# Patient Record
Sex: MALE | Race: BLACK OR AFRICAN AMERICAN | NOT HISPANIC OR LATINO
[De-identification: names, ages, dates, MRNs, and addresses within clinical notes are randomized per-mention and may not be internally consistent; named-entity substitution may affect disease eponyms.]

---
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
## 2023-11-11 ENCOUNTER — EMERGENCY (EMERGENCY)
Facility: HOSPITAL | Age: 18
LOS: 0 days | Discharge: HOME | End: 2023-11-11
Attending: EMERGENCY MEDICINE
Payer: COMMERCIAL

## 2023-11-11 VITALS — HEIGHT: 65 IN | WEIGHT: 110.01 LBS

## 2023-11-11 VITALS
TEMPERATURE: 99 F | DIASTOLIC BLOOD PRESSURE: 61 MMHG | HEART RATE: 80 BPM | SYSTOLIC BLOOD PRESSURE: 103 MMHG | OXYGEN SATURATION: 100 % | RESPIRATION RATE: 14 BRPM

## 2023-11-11 DIAGNOSIS — Z79.01 LONG TERM (CURRENT) USE OF ANTICOAGULANTS: ICD-10-CM

## 2023-11-11 DIAGNOSIS — K51.90 ULCERATIVE COLITIS, UNSPECIFIED, WITHOUT COMPLICATIONS: ICD-10-CM

## 2023-11-11 DIAGNOSIS — Z90.89 ACQUIRED ABSENCE OF OTHER ORGANS: Chronic | ICD-10-CM

## 2023-11-11 DIAGNOSIS — M79.652 PAIN IN LEFT THIGH: ICD-10-CM

## 2023-11-11 DIAGNOSIS — M79.662 PAIN IN LEFT LOWER LEG: ICD-10-CM

## 2023-11-11 DIAGNOSIS — Z86.718 PERSONAL HISTORY OF OTHER VENOUS THROMBOSIS AND EMBOLISM: ICD-10-CM

## 2023-11-11 PROCEDURE — 93010 ELECTROCARDIOGRAM REPORT: CPT

## 2023-11-11 PROCEDURE — 99285 EMERGENCY DEPT VISIT HI MDM: CPT

## 2023-11-11 PROCEDURE — 93005 ELECTROCARDIOGRAM TRACING: CPT

## 2023-11-11 PROCEDURE — 93971 EXTREMITY STUDY: CPT | Mod: LT

## 2023-11-11 PROCEDURE — 93971 EXTREMITY STUDY: CPT | Mod: 26,LT

## 2023-11-11 PROCEDURE — 99284 EMERGENCY DEPT VISIT MOD MDM: CPT | Mod: 25

## 2023-11-11 NOTE — ED STATDOCS - PATIENT PORTAL LINK FT
You can access the FollowMyHealth Patient Portal offered by [facility] by registering at the following website: [email or web address]. By joining [facility]’s FollowMyHealth portal, you will also be able to view your health information using other applications (apps) compatible with our system.

## 2023-11-11 NOTE — ED STATDOCS - ATTENDING APP SHARED VISIT CONTRIBUTION OF CARE
Dr. Garcia: I performed a face to face bedside interview with patient regarding history of present illness, review of symptoms and past medical history. I completed an independent physical exam.  I have discussed patient's plan of care with PA.   I agree with note as stated above, having amended the EMR as needed to reflect my findings.   This includes HISTORY OF PRESENT ILLNESS, HIV, PAST MEDICAL/SURGICAL/FAMILY/SOCIAL HISTORY, ALLERGIES AND HOME MEDICATIONS, REVIEW OF SYSTEMS, PHYSICAL EXAM, and any PROGRESS NOTES during the time I functioned as the attending physician for this patient.  TARA Garcia DO

## 2023-11-11 NOTE — ED ADULT TRIAGE NOTE - CHIEF COMPLAINT QUOTE
Pt arrives to ED complaining of swelling to LLE. Pt with known history of LLE DVT currently on eliquis.

## 2023-11-11 NOTE — ED STATDOCS - NS ED ATTENDING STATEMENT MOD
This was a shared visit with the STEPHEN. I reviewed and verified the documentation and independently performed the documented:

## 2023-11-11 NOTE — ED STATDOCS - PROGRESS NOTE DETAILS
16 yo male with a PMH of UC, was on humira (stopped and switched to entyvio) presents with LLE pain since yesterday. Pt was dx with a DVT in february and was placed on eliquis. Was attempted by his hematologist to be taken off but had a sono around May 2023 which still showed the clot.   No calf pain. Mild ttp to the L lateral lower leg and L medial thigh. No significant swelling noted. Will check sono and reeval. -Rizwan Peck PA-C Sono showing post thrombotic changes. No clot seen. Informed pt and family at bedside. Advised to avoid nsaids, and only take tylenol for pain, and to speak with his hematologist for further evaluation. -Rizwan Peck PA-C

## 2023-11-11 NOTE — ED STATDOCS - PHYSICAL EXAMINATION
Gen:  Well appearing in NAD  Head:  NC/AT  HEENT: pupils perrl,no pharyngeal erythema, uvula midline  Cardiac: S1S2, RRR  Abd: Soft, non tender  Resp: No distress, CTA   musculoskeletal:: no deformities, no swelling, strength +5/+5. +Left leg slightly swollen.  Skin: warm and dry as visualized, no rashes  Neuro: SCHULTE, aao x 4  Psych:alert, cooperative, appropriate mood and affect for situation

## 2023-11-11 NOTE — ED STATDOCS - NSFOLLOWUPINSTRUCTIONS_ED_ALL_ED_FT
Follow up with your hematologist for further evaluation.   Do not take motrin/advil/aleve/ibuprofen.  Only take tylenol for pain. Apply cool compresses or ice over the area of pain.     Return to the Emergency Department for worsening or persistent symptoms, and/or ANY NEW OR CONCERNING SYMPTOMS. If you have issues obtaining follow up, please call: [phone] or [phone]  to obtain a doctor or specialist who takes your insurance in your area.

## 2023-11-11 NOTE — ED STATDOCS - OBJECTIVE STATEMENT
18 y/o male with PMHx of ulcerative colitis on Entyvio, recent LLE DVT (follows with hematologist) on Eliquis, stopped earlier this year but put back on after 5/2023 ultrasound redemonstrating DVT presents to the ED c/o left medial thigh pain in the setting of known LLE DVT, concerned for extension of existing DVT. Patient admits to missing a few doses of his Eliquis recently. Denies recent trauma to the area, SOB, calf tenderness.

## 2023-11-15 ENCOUNTER — EMERGENCY (EMERGENCY)
Facility: HOSPITAL | Age: 18
LOS: 0 days | Discharge: HOME | End: 2023-11-15
Attending: STUDENT IN AN ORGANIZED HEALTH CARE EDUCATION/TRAINING PROGRAM
Payer: COMMERCIAL

## 2023-11-15 VITALS
OXYGEN SATURATION: 99 % | TEMPERATURE: 98 F | DIASTOLIC BLOOD PRESSURE: 60 MMHG | RESPIRATION RATE: 18 BRPM | HEART RATE: 92 BPM | SYSTOLIC BLOOD PRESSURE: 110 MMHG

## 2023-11-15 VITALS — WEIGHT: 110.01 LBS | HEIGHT: 65 IN

## 2023-11-15 DIAGNOSIS — Z79.01 LONG TERM (CURRENT) USE OF ANTICOAGULANTS: ICD-10-CM

## 2023-11-15 DIAGNOSIS — K51.90 ULCERATIVE COLITIS, UNSPECIFIED, WITHOUT COMPLICATIONS: ICD-10-CM

## 2023-11-15 DIAGNOSIS — M79.89 OTHER SPECIFIED SOFT TISSUE DISORDERS: ICD-10-CM

## 2023-11-15 DIAGNOSIS — M25.462 EFFUSION, LEFT KNEE: ICD-10-CM

## 2023-11-15 DIAGNOSIS — Z90.89 ACQUIRED ABSENCE OF OTHER ORGANS: Chronic | ICD-10-CM

## 2023-11-15 DIAGNOSIS — Z86.718 PERSONAL HISTORY OF OTHER VENOUS THROMBOSIS AND EMBOLISM: ICD-10-CM

## 2023-11-15 DIAGNOSIS — M25.062 HEMARTHROSIS, LEFT KNEE: ICD-10-CM

## 2023-11-15 PROCEDURE — 73564 X-RAY EXAM KNEE 4 OR MORE: CPT | Mod: LT

## 2023-11-15 PROCEDURE — 99284 EMERGENCY DEPT VISIT MOD MDM: CPT

## 2023-11-15 PROCEDURE — 73564 X-RAY EXAM KNEE 4 OR MORE: CPT | Mod: 26,LT

## 2023-11-15 PROCEDURE — 99283 EMERGENCY DEPT VISIT LOW MDM: CPT | Mod: 25

## 2023-11-15 RX ORDER — OXYCODONE HYDROCHLORIDE 5 MG/1
1 TABLET ORAL
Qty: 6 | Refills: 0
Start: 2023-11-15 | End: 2023-11-16

## 2023-11-15 RX ORDER — OXYCODONE HYDROCHLORIDE 5 MG/1
5 TABLET ORAL ONCE
Refills: 0 | Status: DISCONTINUED | OUTPATIENT
Start: 2023-11-15 | End: 2023-11-15

## 2023-11-15 RX ORDER — ACETAMINOPHEN 500 MG
975 TABLET ORAL ONCE
Refills: 0 | Status: COMPLETED | OUTPATIENT
Start: 2023-11-15 | End: 2023-11-15

## 2023-11-15 RX ADMIN — Medication 975 MILLIGRAM(S): at 15:39

## 2023-11-15 RX ADMIN — OXYCODONE HYDROCHLORIDE 5 MILLIGRAM(S): 5 TABLET ORAL at 15:39

## 2023-11-15 NOTE — ED STATDOCS - PHYSICAL EXAMINATION
Const: Well appearing, NAD  Eyes: PERRL, EOM intact  CV: RRR, no murmurs, no chest wall tenderness, distal pulses intact  Resp: CTAB, normal resp effort  GI: soft, nondistended, nontender  MSK: Full ROM, no muscle or bony deformity or tenderness. +Left knee effusion.  Neuro: AOx3, GCS 15, No focal deficits  Skin: No rash, laceration or abrasion  Psych: calm, cooperative, No SI, HI, hallucination. Const: Well appearing, NAD  Eyes: PERRL, EOM intact  CV: RRR, no murmurs, no chest wall tenderness, distal pulses intact  Resp: CTAB, normal resp effort  GI: soft, nondistended, nontender  MSK: Full ROM, no muscle or bony deformity. +Left knee effusion with tenderness.  Neuro: AOx3, GCS 15, No focal deficits  Skin: No rash, laceration or abrasion  Psych: calm, cooperative, No SI, HI, hallucination.

## 2023-11-15 NOTE — ED ADULT NURSE NOTE - NSFALLUNIVINTERV_ED_ALL_ED
Bed/Stretcher in lowest position, wheels locked, appropriate side rails in place/Call bell, personal items and telephone in reach/Instruct patient to call for assistance before getting out of bed/chair/stretcher/Non-slip footwear applied when patient is off stretcher/[place] to call system/Physically safe environment - no spills, clutter or unnecessary equipment/Purposeful proactive rounding/Room/bathroom lighting operational, light cord in reach

## 2023-11-15 NOTE — ED STATDOCS - OBJECTIVE STATEMENT
16 y/o male with PMHx of ulcerative colitis on Entyvio, diagnosed with DVT in April on Eliquis presents to the ED c/o left knee swelling. Patient was seen here 11/11 for ultrasound and was told his DVTs had gotten smaller, likely blood clot scarring, however presents today for persistent pain/swelling and difficulty walking. Has been taking 2 Tylenol per day for pain.

## 2023-11-15 NOTE — ED STATDOCS - PROGRESS NOTE DETAILS
Pt. is a 17 year old male Hx ulcerative colitis on Eliquis, presents with left knee swelling.  Pt. states he bumped it on a bedframe four days ago and swelling worse.  Pt. evaluated in ED 11/11 and his DVT (has history) has improved.  Pt. now with pain, swelling of knee.  No new trauma.  Tylenol taken without relief. Nydia George PA-C xray negative.  Probable hemarthrosis due to Eliquis use.  Will apply ace wrap and orthopedic follow up.  Discussed close follow up with patient and return precautions.  Pt. deferred cane .  Nydia George PA-C

## 2023-11-15 NOTE — ED STATDOCS - ATTENDING APP SHARED VISIT CONTRIBUTION OF CARE
I, Trey Guido MD,  performed the initial face to face bedside interview with this patient regarding history of present illness, review of symptoms and relevant past medical, social and family history.  I completed an independent physical examination.  I was the initial provider who evaluated this patient.   I personally saw the patient and performed a substantive portion of the visit including all aspects of the medical decision making.  I have signed out the follow up of any pending tests (i.e. labs, radiological studies) to the STEPHEN.  I have communicated the patient’s plan of care and disposition with the STEPHEN.  The history, relevant review of systems, past medical and surgical history, medical decision making, and physical examination was documented by the scribe in my presence and I attest to the accuracy of the documentation.

## 2023-11-15 NOTE — ED STATDOCS - OTHER DETAILS FREE TEXT FOR MDM ADDL HISTORY OBTAINED FROM QUESTION
Chris Plunkett MD  Physician  Internal Medicine  Progress Notes      Signed  Date of Service:  2023  8:35 AM     Signed                                                                                                                                                                                                                                                                  [address] [place] [address], [facility]  [phone]        Hospitalist Progress Note        NAME:            Sofia Faye              :               10/4/1935  MRM:              [id number]     Date of service:         2023  8:35 AM         Assessment and Plan:   GI bleed/ Occult blood positive stool: Her hemoglobin from  of this year, which was 12.3 and on admission was 8. 3. today 6.2. Keep n.p.o., IV fluids. Plan for EGD today. Monitor H&H. GI evaluation appreciated      2. Anemia due to GI bleed. Hb 6.2 today. Will transfuse 1 unit of PRBC. Consent received for her son. Monitor H&H and transfuse as needed. 3.  HTN (hypertension). Continue home lisinopril. Hold hydrochlorothiazide. Hydralazine IV PRN       4. Panlobular emphysema ([address]). Stable. Not wheezing. Nebs as needed      5. Dementia (HCC)/ delirium. Haldol IV as needed. May need a sitter. CODE STATUS: DNR (decision-maker: Son)            Subjective:      Chief Complaint[de-identified] Patient was seen and examined as a follow up for GI bleed. Chart was reviewed. confused     ROS:  (bold if positive, if negative)     Tolerating PT             Tolerating Diet         Objective:      Last 24hrs VS reviewed since prior progress note.  Most recent are:         Vitals:     23 0505   BP: (!) 160/72   Pulse:     Resp:     Temp:     SpO2:            SpO2 Readings from Last 6 Encounters:   23 97%   06/15/23 95%   06/10/23 97%   23 97%           Intake/Output Summary (Last 24 hours) at 2023 0835  Last BUN 65* 41*   ALT 11*  --          No results found for: GLUCPOC  No results for input(s): PH, PCO2, PO2, HCO3, FIO2 in the last 72 hours. No results for input(s): INR in the last 72 hours. [unfilled]     I have reviewed notes of prior 24hr. Other pertinent lab: Total time: -35- minutes. I personally saw and examined the patient during this time period. Greater than 50% of this time was spent in counseling and coordination of care. I personally reviewed chart, notes, data and current medications in the medical record. I have personally examined and treated the patient at bedside during this period. Care Plan discussed with: Patient, Family, Nursing Staff, and >50% of time spent in counseling and coordination of care     Discussed:  Care Plan     Prophylaxis:  SCD's     Disposition:  SNF/LTC                                                                                        ___________________________________________________     Attending Physician:   MD Preethi Carrizales MD  Physician  Gastroenterology  Progress Notes      Signed  Date of Service:  8/7/2023  3:31 PM     Signed                      GI     Very extensive ulceration second portion of the duodenum identified; significant ulceration of the gastric antrum also seen. Fortunately no active blood loss at this point in time. We will place on 4 times daily pantoprazole for 2 weeks; this dosing schedule mimics total milligrams dosing used for constant infusions for acute GI blood loss. After 2 weeks can drop dose to twice daily but will leave on twice daily dosing long-term. From GI point of view discharge when hemoglobin stable.      Thank you for your kind referrals              ED to Hosp-Admission (Discharged) on 8/6/2023    ED to Hosp-Admission (Discharged) on 8/6/2023      Detailed Prior ED visit.

## 2023-11-15 NOTE — ED ADULT TRIAGE NOTE - CHIEF COMPLAINT QUOTE
pt presents to ER with left knee swelling since this past weekend.. hx DVT in April started on Apixaban. swelling to kneecap significant. + tactile warmth. pt states "I feel something moving in the knee". denies SOB at this time.

## 2023-11-15 NOTE — ED STATDOCS - PATIENT PORTAL LINK FT
You can access the FollowMyHealth Patient Portal offered by [facility] by registering at the following website: [email or web address]. By joining [employer]’s FollowMyHealth portal, you will also be able to view your health information using other applications (apps) compatible with our system.

## 2023-11-15 NOTE — ED STATDOCS - PRINCIPAL DIAGNOSIS
[Negative] : Constitutional [FreeTextEntry5] : see HPI [FreeTextEntry6] : breathing improved [FreeTextEntry9] : shoulder pain Hemarthrosis

## 2023-11-15 NOTE — ED STATDOCS - NSFOLLOWUPINSTRUCTIONS_ED_ALL_ED_FT
Hemarthrosis    WHAT YOU NEED TO KNOW:    What is hemarthrosis? Hemarthrosis is bleeding into a joint, usually after an injury. Blood vessels inside the joint are damaged and bleed. The blood then collects in the joint space. The shoulder and knee joints are most commonly affected. Elbow, ankle, and hip joints may also be affected.    What increases my risk for hemarthrosis?    Hemophilia or other blood disorder    Blood thinner medicine    A tumor, neuropathy, or myelopathy    Osteoarthritis or septic arthritis    Arthroplasty (surgery) on your knee joint    Cartilage or vascular damage    Sickle cell disease or scurvy  What are the signs and symptoms of hemarthrosis?    Warmth or tingling in the joint    Joint pain or swelling    Red skin over the affected joint    Trouble moving the joint, or joint stiffness  How is hemarthrosis diagnosed? Your healthcare provider will examine your affected joint. Tell your provider about your symptoms and when they started. Your provider will ask about any medical conditions you have, such as hemophilia. Tell your provider if your joint was injured or if you had a recent knee arthroplasty. Your provider may ask if you are taking blood thinner medicine. You may also need any of the following:    Aspiration is a procedure used to take fluid from the joint to be tested for blood. This procedure is also called arthrocentesis. Your healthcare provider may also use aspiration to treat your hemarthrosis.    MRI or ultrasound pictures may show joint damage or other problems. Do not enter the MRI room with anything metal. Metal can cause serious damage. Tell the healthcare provider if you have any metal in or on your body.  How is hemarthrosis treated?    Rest may help stop the bleeding. Your healthcare provider may also want you to use crutches or a sling to help rest the affected joint. Your provider may recommend that you limit the amount of time you rest the joint. Long periods without movement can cause problems such as muscle contracture (shortening).    Medicines may be given to improve clotting if you have hemophilia. If your hemarthrosis was caused by blood thinners, your provider may change your dose. Your provider may have you stop using them until your blood clotting ability improves. Do not stop taking your medicine unless directed. A sudden stop can be life-threatening.    Surgery may be used to remove lining from the joint, or bone from near the affected joint.    Joint replacement may be needed if other treatments do not work. All or part of the joint is replaced with an artificial piece during surgery.  What can I do to manage hemarthrosis?    Ask about medicines. Talk to your healthcare provider about all medicines you currently take. Do not take any medicine, vitamin, or supplement without talking to your healthcare provider. Ask if it is safe for you to take aspirin. Aspirin can affect blood clotting.    Return to activities as directed. You may need to wait until your joint is rested or medicines are working. Ask if it is safe for you to play sports, such as football, that can cause injury or bleeding. Sports can be especially dangerous if you have hemophilia.    Go to physical therapy as directed. Your healthcare provider may also recommend that you work with a physical therapist if you have joint damage from hemarthrosis. A physical therapist can help improve your joint's range of motion.    Exercise as directed. Exercise helps strengthen muscles and keeps joints healthy. Strong muscles also help protect joints. Your healthcare provider may recommend exercise such as swimming, walking, or riding a bicycle. Ask how much exercise you need and which exercises are best for you.    Apply ice to the joint as directed. Ice helps reduce pain and swelling. Ice can also help prevent tissue damage. Use an ice pack, or put ice in a plastic bag and cover it with a towel. Place the ice pack or bag on the affected joint for 15 minutes every hour, or as directed.  When should I seek immediate care?    You have new or worsening joint pain.    You have joint pain that moves to a muscle or other area.    You cannot move the joint.  When should I contact my healthcare provider?    You have pain that does not get better after you take your pain medicine.    You have questions or concerns about your condition or care.  CARE AGREEMENT:    You have the right to help plan your care. Learn about your health condition and how it may be treated. Discuss treatment options with your healthcare providers to decide what care you want to receive. You always have the right to refuse treatment.    © [employer] 1973, 2023    	  back to top

## 2023-11-15 NOTE — ED STATDOCS - CLINICAL SUMMARY MEDICAL DECISION MAKING FREE TEXT BOX
Patient with left knee effusion, history of DVTs on Eliquis. Seen here 11/11, received sonogram at that time with no findings for DVT. Will get XR left knee, pain control, reassess. Patient with left knee effusion, history of DVTs on Eliquis. Seen here 11/11, received sonogram at that time with no findings for DVT. Will get XR left knee, pain control, reassess.          xray negative.  Probable hemarthrosis due to Eliquis use.  Will apply ace wrap and orthopedic follow up.  Discussed close follow up with patient and return precautions.  Pt. deferred cane .  Nydia George PA-C

## 2023-11-15 NOTE — ED STATDOCS - CARE PROVIDER_API CALL
Augusto Teran  Orthopaedic Surgery  [address], [address]  [place] [address]  Phone: [phone]  Fax: [phone]  Follow Up Time:

## 2023-11-15 NOTE — ED ADULT NURSE NOTE - CAS ELECT INFOMATION PROVIDED
[facility] Gastroenterology  Pre Procedure History & Physical    Chief Complaint:   Screening    Subjective     HPI:   Screening  His father had colon cancer at a young age.  Denies any other family history of colon polyps or colon cancer.  1 relative did have throat cancer.  Past Medical History:   Past Medical History:   Diagnosis Date   • Asthma     Mild COPD   • Cholelithiasis     Surgically removed   • COPD (chronic obstructive pulmonary disease) (HCC)    • Encephalitis    • Hypertension    • Liver fibrosis     stage 3   • Low back pain    • Obesity All my life   • Pancreatitis    • Pulmonary hypertension (HCC)     patient states he haad a tralee event while giving blood and went into pulmonary htn   • Scoliosis    • Sleep apnea     cpap   • Thoracic back pain        Past Surgical History:  Past Surgical History:   Procedure Laterality Date   • CHOLECYSTECTOMY     • ENDOSCOPY N/A 2020    Procedure: ESOPHAGOGASTRODUODENOSCOPY WITH ANESTHESIA;  Surgeon: Solo Chirinos MD;  Location: [facility] ENDOSCOPY;  Service: Gastroenterology;  Laterality: N/A;  preop; acid reflux; abdominal pain; difficulty swallowing  postop  Frandy Park MD   • SINUS SURGERY         Family History:  Family History   Problem Relation Age of Onset   • Colon cancer Father    • Cancer Father            • Early death Father            • Liver disease Mother            • Early death Mother            • Heart disease Mother            • Asthma Sister    • Alcohol abuse Maternal Grandfather            • Cancer Maternal Grandfather            • Vision loss Maternal Grandfather    • Cancer Maternal Grandmother            • Miscarriages / Stillbirths Maternal Grandmother    • Alcohol abuse Paternal Grandfather            • Diabetes Paternal Grandmother    • Anxiety disorder Daughter    • Birth defects Daughter    • Developmental Disability Daughter   "  • Learning disabilities Daughter    • Mental illness Daughter    • Asthma Sister    • Birth defects Son    • Developmental Disability Son    • Learning disabilities Son    • Mental illness Son        Social History:   reports that he has never smoked. He has never used smokeless tobacco. He reports that he does not drink alcohol and does not use drugs.    Medications:   Prior to Admission medications    Medication Sig Start Date End Date Taking? Authorizing Provider   amLODIPine (Norvasc) 10 MG tablet Take 1 tablet by mouth Daily. 3/15/23  Yes Frandy Park MD   Unable to find 1 each 1 (One) Time. Tuddca    biosalt supplement   500mg BID    ProviderMichael MD       Allergies:  Patient has no known allergies.    ROS:    General: Weight stable  Resp: No SOA  Cardiovascular: No CP    Objective     Blood pressure 140/80, pulse 69, temperature 97 °F (36.1 °C), temperature source Temporal, resp. rate 20, height 180.3 cm (71\"), weight (!) 151 kg (332 lb), SpO2 99 %.    Physical Exam   Constitutional: Pt is oriented to person, place, and in no distress.   Cardiovascular: Normal rate, regular rhythm.    Pulmonary/Chest: Effort normal. No respiratory distress.   Abdominal: Non-distended.  Psychiatric: Mood, memory, affect and judgment appear normal.     Assessment & Plan     Diagnosis:  Screening    Anticipated Surgical Procedure:  Colonoscopy    The risks, benefits, and alternatives of this procedure have been discussed with the patient or the responsible party- the patient understands and agrees to proceed.    EMR Dragon/transcription disclaimer:  Much of this encounter note is electronic transcription/translation of spoken language to printed text.  The electronic translation of spoken language may be erroneous, or at times, nonsensical words or phrases may be inadvertently transcribed.  Although I have reviewed the note for such errors, some may still exist.  " DC instructions

## 2023-11-16 ENCOUNTER — APPOINTMENT (OUTPATIENT)
Dept: ORTHOPEDIC SURGERY | Facility: CLINIC | Age: 18
End: 2023-11-16
Payer: COMMERCIAL

## 2023-11-16 VITALS
BODY MASS INDEX: 18.11 KG/M2 | HEART RATE: 92 BPM | DIASTOLIC BLOOD PRESSURE: 60 MMHG | HEIGHT: 65.5 IN | WEIGHT: 110 LBS | SYSTOLIC BLOOD PRESSURE: 95 MMHG

## 2023-11-16 DIAGNOSIS — Z87.19 PERSONAL HISTORY OF OTHER DISEASES OF THE DIGESTIVE SYSTEM: ICD-10-CM

## 2023-11-16 DIAGNOSIS — Z86.718 PERSONAL HISTORY OF OTHER VENOUS THROMBOSIS AND EMBOLISM: ICD-10-CM

## 2023-11-16 DIAGNOSIS — M65.9 SYNOVITIS AND TENOSYNOVITIS, UNSPECIFIED: ICD-10-CM

## 2023-11-16 PROCEDURE — 99203 OFFICE O/P NEW LOW 30 MIN: CPT

## 2023-11-17 ENCOUNTER — APPOINTMENT (OUTPATIENT)
Dept: VASCULAR SURGERY | Facility: CLINIC | Age: 18
End: 2023-11-17
Payer: COMMERCIAL

## 2023-11-17 VITALS
HEIGHT: 65.5 IN | SYSTOLIC BLOOD PRESSURE: 104 MMHG | OXYGEN SATURATION: 100 % | HEART RATE: 76 BPM | DIASTOLIC BLOOD PRESSURE: 71 MMHG

## 2023-11-17 DIAGNOSIS — I82.519 CHRONIC EMBOLISM AND THROMBOSIS OF UNSPECIFIED FEMORAL VEIN: ICD-10-CM

## 2023-11-17 PROCEDURE — 99203 OFFICE O/P NEW LOW 30 MIN: CPT

## 2023-11-20 PROBLEM — I82.519 DVT, FEMORAL, CHRONIC: Status: ACTIVE | Noted: 2023-11-20

## 2023-11-22 DIAGNOSIS — J45.40 MODERATE PERSISTENT ASTHMA, UNCOMPLICATED: ICD-10-CM

## 2023-11-22 PROBLEM — Z87.19 HISTORY OF ULCERATIVE COLITIS: Status: RESOLVED | Noted: 2023-11-21 | Resolved: 2023-11-22

## 2023-11-22 PROBLEM — Z86.718 HISTORY OF BLOOD CLOTS: Status: RESOLVED | Noted: 2023-11-21 | Resolved: 2023-11-22

## 2023-11-30 PROBLEM — M65.9 SYNOVITIS: Status: ACTIVE | Noted: 2023-11-17

## 2023-12-14 PROBLEM — K51.90 ULCERATIVE COLITIS, UNSPECIFIED, WITHOUT COMPLICATIONS: Chronic | Status: ACTIVE | Noted: 2023-11-25

## 2023-12-14 PROBLEM — I82.409 ACUTE EMBOLISM AND THROMBOSIS OF UNSPECIFIED DEEP VEINS OF UNSPECIFIED LOWER EXTREMITY: Chronic | Status: ACTIVE | Noted: 2023-11-25

## 2023-12-20 ENCOUNTER — APPOINTMENT (OUTPATIENT)
Dept: MRI IMAGING | Facility: CLINIC | Age: 18
End: 2023-12-20

## 2024-01-30 ENCOUNTER — APPOINTMENT (OUTPATIENT)
Dept: MRI IMAGING | Facility: CLINIC | Age: 19
End: 2024-01-30
Payer: COMMERCIAL

## 2024-01-30 ENCOUNTER — TRANSCRIPTION ENCOUNTER (OUTPATIENT)
Age: 19
End: 2024-01-30

## 2024-01-30 PROCEDURE — 73721 MRI JNT OF LWR EXTRE W/O DYE: CPT | Mod: LT

## 2024-01-31 ENCOUNTER — NON-APPOINTMENT (OUTPATIENT)
Age: 19
End: 2024-01-31

## 2024-02-05 ENCOUNTER — APPOINTMENT (OUTPATIENT)
Dept: ORTHOPEDIC SURGERY | Facility: CLINIC | Age: 19
End: 2024-02-05

## 2024-02-15 ENCOUNTER — RESULT REVIEW (OUTPATIENT)
Age: 19
End: 2024-02-15

## 2024-02-15 ENCOUNTER — APPOINTMENT (OUTPATIENT)
Dept: RHEUMATOLOGY | Facility: CLINIC | Age: 19
End: 2024-02-15
Payer: COMMERCIAL

## 2024-02-15 ENCOUNTER — LABORATORY RESULT (OUTPATIENT)
Age: 19
End: 2024-02-15

## 2024-02-15 VITALS
BODY MASS INDEX: 18.83 KG/M2 | WEIGHT: 113 LBS | OXYGEN SATURATION: 98 % | HEART RATE: 101 BPM | DIASTOLIC BLOOD PRESSURE: 60 MMHG | HEIGHT: 65 IN | SYSTOLIC BLOOD PRESSURE: 120 MMHG | TEMPERATURE: 206.6 F

## 2024-02-15 DIAGNOSIS — M79.641 PAIN IN RIGHT HAND: ICD-10-CM

## 2024-02-15 DIAGNOSIS — M25.572 PAIN IN LEFT ANKLE AND JOINTS OF LEFT FOOT: ICD-10-CM

## 2024-02-15 DIAGNOSIS — M54.2 CERVICALGIA: ICD-10-CM

## 2024-02-15 DIAGNOSIS — M79.642 PAIN IN RIGHT HAND: ICD-10-CM

## 2024-02-15 DIAGNOSIS — M54.9 DORSALGIA, UNSPECIFIED: ICD-10-CM

## 2024-02-15 LAB
CK SERPL-CCNC: 21 U/L
CRP SERPL-MCNC: 57 MG/L
ERYTHROCYTE [SEDIMENTATION RATE] IN BLOOD BY WESTERGREN METHOD: 116 MM/HR
HCT VFR BLD CALC: 38.3 %
HGB BLD-MCNC: 12 G/DL
MCHC RBC-ENTMCNC: 25.2 PG
MCHC RBC-ENTMCNC: 31.3 GM/DL
MCV RBC AUTO: 80.3 FL
PLATELET # BLD AUTO: 504 K/UL
RBC # BLD: 4.77 M/UL
RBC # FLD: 15.5 %
RHEUMATOID FACT SER QL: 12 IU/ML
THYROGLOB AB SERPL-ACNC: <20 IU/ML
THYROPEROXIDASE AB SERPL IA-ACNC: <10 IU/ML
URATE SERPL-MCNC: 4.7 MG/DL
WBC # FLD AUTO: 9.47 K/UL

## 2024-02-15 PROCEDURE — 36415 COLL VENOUS BLD VENIPUNCTURE: CPT

## 2024-02-15 PROCEDURE — 99205 OFFICE O/P NEW HI 60 MIN: CPT

## 2024-02-15 RX ORDER — PREDNISONE 20 MG/1
20 TABLET ORAL
Refills: 0 | Status: ACTIVE | COMMUNITY

## 2024-02-15 RX ORDER — VEDOLIZUMAB 300 MG/5ML
INJECTION, POWDER, LYOPHILIZED, FOR SOLUTION INTRAVENOUS
Refills: 0 | Status: ACTIVE | COMMUNITY

## 2024-02-15 RX ORDER — APIXABAN 5 MG/1
5 TABLET, FILM COATED ORAL TWICE DAILY
Refills: 0 | Status: ACTIVE | COMMUNITY

## 2024-02-15 NOTE — ASSESSMENT
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  Patient has history of lingual tonsil biopsy with pathology showing florid follicular lymphoid hyperplasia  DVT on left leg (9 months ago, follows with heme and continues to be on eliquis 5mg) ,  Hx of ulcerative colitis diagnosed in 12/2022 (through colonoscopy , GI Dr. Mcgraw) when he had joint pain in his ankles, swelling , neck followed by GI issues with blood in stool, multiple BMs. He had been having these symptoms since 5/2021. Initially, he was on Humira in 1/2023 and was on it for 5- 6 months which helped the joint pain but did not help with the GI symptoms. Then transitioned to Vedolizumab/entivyo IV around 7/2023 (infusions was every 6 weeks now 8 weeks). This helped with the GI symptoms but not the joint pain.  He is having less  blood stools,less BMs. However, now reoccurrence of joint pain. Patient has pain left  knee, left ankles episodes Pain is in the medial left ankle and medial left knee. Right leg does not bother him.  He saw orthopedics in 11/2023 for left knee pain and swelling with MRI confirming inflammatory arthritis from 1/30/24 Patient is on prednisone on and off by GI (in [place] and also his GI in [place])  for over a year. Started on prednisone 20mg by his GI a week ago. It has helped his pain but not so much the swelling in his knee. Other symptoms include low back pain especially at night while sleeping, fatigue, recent weight loss, pain in his Achilles tendon on and off, morning stiffness Sometimes gets rashes (describes as red dots on his legs which goes away on its own, gets during UC flares)  Denies eye inflammation/uveitis, dactylitis, psoriasis  MRI of Left knee (2/5/2024): Large joint effusion with synovitis and associated mild osteitis. Inner margin frayingo f lateral meniscus. DDx inflammatory arthritis vs. Crystalline arthritis. Cannot rule out septic arthritis with early osteomyelitis.   -Given history of IBD/ulcerative colitis will evaluate for other autoimmune inflammatory arthritis with serologies as below.  He likely has IBD associated arthritis given joint pain swelling -Advised to follow-up with orthopedics as they had planned to do aspiration however he missed the appointment.  -Will continue to be on Entyvio for now as per GI.  However as it is not controlling his joint pain would consider switching to another biologic that would help both the arthritis and GI symptoms pending blood work.  Patient reports his GI doctor wants to start him on Stelara which I agree would help with both symptoms -Prednisone per GI -X-rays as below -He is seeing hematology for history of DVT and is on Eliquis; unclear if it was in the setting of positive antiphospholipid antibodies however no point in getting them now since he is already on Eliquis and results can be falsely positive.  He will share notes from them with me    Total time spent in review of patient history, clinical exam, management, counseling, and plan of care: 66 min

## 2024-02-15 NOTE — HISTORY OF PRESENT ILLNESS
[FreeTextEntry1] : 19 y/o male presents for "flare up in legs causing difficulty walking".  Patient has history of lingual tonsil biopsy with pathology showing florid follicular lymphoid hyperplasia. The mass had grown back and patient was discussing with ENT about potential surgical removal (last seen 2020).  History of DVT on left leg (9 months ago, follows with heme and continues to be on eliquis 5mg) ,  Hx of ulcerative colitis diagnosed in 12/2022 (through colonoscopy , GI Dr. Mcgraw) when he had joint pain in his ankles, swelling , neck followed by GI issues with blood in stool, multiple BMs. he had been having these symptoms since 5/2021. Initially, he was on Humira in 1/2023 and was on it for 5- 6 months which helped the joint pain but did not help with the GI symptoms. Then transitioned to Vedolizumab/entivyo IV around 7/2023 (infusions was every 6 weeks now 8 weeks). This helped with the GI symptoms but not the joint pain.  He is having less  blood stools,less BMs. However, now reoccurrence of joint pain. Patient has pain left  knee, left ankles episodes Pain is in the medial left ankle and medial left knee. Right leg does not bother him.  He saw orthopedics in 11/2023 for left knee pain and swelling with MRI confirming inflammatory arthritis from 1/30/24 Patient has not had some autoimmune labwork ordered by orthopedics yet and has not had follow up since first visit (patient planned for possible knee aspiration on the follow up visit).  Patient is on prednisone on and off by GI (in [place] and also his GI in [place])  for over a yea. Started on prednisone 20mg by his GI a week ago. It has helped his pain but not so much the swelling in his knee.  Patient has low back pain nyasia at night while sleeping. He also reports cramping in LE /bottom of feet at nigh time Has  morning stiffness  Intermittent fatigue, recent weight loss  Sometimes pain in Achilles tendon, no dactylitis.  Sometimes hand pain at night and he reports cracking his joints which makes it better Sometimes gets rashes (describes as red dots on his legs which goes away on its own, gets during UC flares) Patient denies other episodes of joint pains, joint swelling, joint erythema/warmth,,  fever, chills,  nasopharyngeal ulcers, chest pain, SOB, nausea, vomiting, diarrhea, dysuria, hematuria, no recent rash, photosensitivity, Raynaud's, alopecia, dry eyes, dry mouth, eye pain/redness, vision changes, myalgias, muscle weakness, dysphagia, numbness/tingling,   MRI of Left knee (2/5/2024): Large joint effusion with synovitis and associated mild osteitis. Inner margin frayingo f lateral meniscus. DDx inflammatory arthritis vs. Crystalline arthritis. Cannot rule out septic arthritis with early osteomyelitis.       PMHx: As above PSHx: denies Family Hx: Denies family history of rheumatologic conditions including RA, SLE, Sjogren's, Myositis, scleroderma, or vasculitis Social Hx:  Smoking Hx: denies EtOH Hx: denies Drug use: marijuana Occupation: currently not working, high school  graduate

## 2024-02-15 NOTE — PHYSICAL EXAM
[TextEntry] :   GENERAL: Appears in no acute distress HEENT: EOMI. No conjunctival erythema. Moist mucous membranes. No nasopharyngeal ulcers NECK: Supple, no cervical lymphadenopathy ABDOMINAL: Soft, nontender MSK: Left knee swelling with some warmth, left ankle swelling No other active synovitis, swelling, erythema, or warmth. No  joint tenderness to palpation.  No SI joint tenderness, there is some tenderness of his L4 /L5 area Tenderness of left Achilles tendon No Bouchards or Heberdens nodess No deformities. No crepitus. Normal ROM of neck, back, b/l upper and lower extremities. No dactylitis,  nail pitting SKIN: No lesions or rashes NEURO: No focal deficits. Motor strength 5/5 in major muscle groups of b/l UE and LE. Sensation to soft touch intact in major dermatomes of b/l UE and LE. PSYCH: Normal affect and thought process.

## 2024-02-16 LAB
ALBUMIN SERPL ELPH-MCNC: 3.8 G/DL
ALP BLD-CCNC: 87 U/L
ALT SERPL-CCNC: 11 U/L
ANION GAP SERPL CALC-SCNC: 10 MMOL/L
APPEARANCE: CLEAR
AST SERPL-CCNC: 7 U/L
BILIRUB SERPL-MCNC: 0.3 MG/DL
BILIRUBIN URINE: NEGATIVE
BLOOD URINE: ABNORMAL
BUN SERPL-MCNC: 9 MG/DL
C3 SERPL-MCNC: 141 MG/DL
C4 SERPL-MCNC: 31 MG/DL
CALCIUM SERPL-MCNC: 9.4 MG/DL
CCP AB SER IA-ACNC: <8 UNITS
CHLORIDE SERPL-SCNC: 100 MMOL/L
CO2 SERPL-SCNC: 27 MMOL/L
COLOR: YELLOW
CREAT SERPL-MCNC: 0.65 MG/DL
CREAT SPEC-SCNC: 99 MG/DL
CREAT/PROT UR: 0.1 RATIO
EGFR: 138 ML/MIN/1.73M2
GLUCOSE QUALITATIVE U: NEGATIVE MG/DL
GLUCOSE SERPL-MCNC: 93 MG/DL
HBV CORE IGG+IGM SER QL: NONREACTIVE
HBV SURFACE AB SER QL: NONREACTIVE
HBV SURFACE AG SER QL: NONREACTIVE
HCV AB SER QL: NONREACTIVE
HCV S/CO RATIO: 0.15 S/CO
KETONES URINE: NEGATIVE MG/DL
LEUKOCYTE ESTERASE URINE: NEGATIVE
NITRITE URINE: NEGATIVE
PH URINE: 6
POTASSIUM SERPL-SCNC: 4.4 MMOL/L
PROT SERPL-MCNC: 8 G/DL
PROT UR-MCNC: 13 MG/DL
PROTEIN URINE: NEGATIVE MG/DL
RF+CCP IGG SER-IMP: NEGATIVE
SODIUM SERPL-SCNC: 137 MMOL/L
SPECIFIC GRAVITY URINE: 1.01
UROBILINOGEN URINE: 0.2 MG/DL

## 2024-02-17 LAB
DSDNA AB SER-ACNC: 22 IU/ML
ENA RNP AB SER IA-ACNC: <0.2 AL
ENA SM AB SER IA-ACNC: <0.2 AL
ENA SS-A AB SER IA-ACNC: <0.2 AL
ENA SS-B AB SER IA-ACNC: <0.2 AL

## 2024-02-19 LAB — ANA SER IF-ACNC: NEGATIVE

## 2024-02-20 DIAGNOSIS — M00.9 PYOGENIC ARTHRITIS, UNSPECIFIED: ICD-10-CM

## 2024-02-20 DIAGNOSIS — Z86.718 PERSONAL HISTORY OF OTHER VENOUS THROMBOSIS AND EMBOLISM: ICD-10-CM

## 2024-02-20 DIAGNOSIS — R21 RASH AND OTHER NONSPECIFIC SKIN ERUPTION: ICD-10-CM

## 2024-02-20 DIAGNOSIS — R19.4 CHANGE IN BOWEL HABIT: ICD-10-CM

## 2024-02-20 DIAGNOSIS — D64.9 ANEMIA, UNSPECIFIED: ICD-10-CM

## 2024-02-20 DIAGNOSIS — M79.673 PAIN IN UNSPECIFIED FOOT: ICD-10-CM

## 2024-02-20 LAB
M TB IFN-G BLD-IMP: NEGATIVE
QUANTIFERON TB PLUS MITOGEN MINUS NIL: 6.39 IU/ML
QUANTIFERON TB PLUS NIL: 0.02 IU/ML
QUANTIFERON TB PLUS TB1 MINUS NIL: 0 IU/ML
QUANTIFERON TB PLUS TB2 MINUS NIL: 0 IU/ML

## 2024-02-21 ENCOUNTER — RESULT CHARGE (OUTPATIENT)
Age: 19
End: 2024-02-21

## 2024-02-22 ENCOUNTER — APPOINTMENT (OUTPATIENT)
Dept: ORTHOPEDIC SURGERY | Facility: CLINIC | Age: 19
End: 2024-02-22
Payer: COMMERCIAL

## 2024-02-22 DIAGNOSIS — M65.9 SYNOVITIS AND TENOSYNOVITIS, UNSPECIFIED: ICD-10-CM

## 2024-02-22 LAB — HLA-B27 QL NAA+PROBE: NORMAL

## 2024-02-22 PROCEDURE — 99213 OFFICE O/P EST LOW 20 MIN: CPT

## 2024-02-23 LAB — 14-3-3 ETA AG SER IA-MCNC: 0.97 NG/ML

## 2024-02-26 ENCOUNTER — APPOINTMENT (OUTPATIENT)
Dept: RADIOLOGY | Facility: CLINIC | Age: 19
End: 2024-02-26
Payer: COMMERCIAL

## 2024-02-26 ENCOUNTER — OUTPATIENT (OUTPATIENT)
Dept: OUTPATIENT SERVICES | Facility: HOSPITAL | Age: 19
LOS: 1 days | End: 2024-02-26
Payer: COMMERCIAL

## 2024-02-26 DIAGNOSIS — M25.473 EFFUSION, UNSPECIFIED ANKLE: ICD-10-CM

## 2024-02-26 DIAGNOSIS — M25.572 PAIN IN LEFT ANKLE AND JOINTS OF LEFT FOOT: ICD-10-CM

## 2024-02-26 DIAGNOSIS — M79.641 PAIN IN RIGHT HAND: ICD-10-CM

## 2024-02-26 DIAGNOSIS — Z90.89 ACQUIRED ABSENCE OF OTHER ORGANS: Chronic | ICD-10-CM

## 2024-02-26 PROCEDURE — 72202 X-RAY EXAM SI JOINTS 3/> VWS: CPT | Mod: 26

## 2024-02-26 PROCEDURE — 72202 X-RAY EXAM SI JOINTS 3/> VWS: CPT

## 2024-02-26 PROCEDURE — 73630 X-RAY EXAM OF FOOT: CPT | Mod: 26,50

## 2024-02-26 PROCEDURE — 72050 X-RAY EXAM NECK SPINE 4/5VWS: CPT | Mod: 26

## 2024-02-26 PROCEDURE — 72100 X-RAY EXAM L-S SPINE 2/3 VWS: CPT | Mod: 26

## 2024-02-26 PROCEDURE — 73130 X-RAY EXAM OF HAND: CPT | Mod: 26,50

## 2024-02-26 PROCEDURE — 73630 X-RAY EXAM OF FOOT: CPT

## 2024-02-26 PROCEDURE — 73610 X-RAY EXAM OF ANKLE: CPT | Mod: 26,50

## 2024-02-26 PROCEDURE — 72050 X-RAY EXAM NECK SPINE 4/5VWS: CPT

## 2024-02-26 PROCEDURE — 73130 X-RAY EXAM OF HAND: CPT

## 2024-02-26 PROCEDURE — 72100 X-RAY EXAM L-S SPINE 2/3 VWS: CPT

## 2024-02-26 PROCEDURE — 73610 X-RAY EXAM OF ANKLE: CPT

## 2024-02-27 VITALS — WEIGHT: 113 LBS | BODY MASS INDEX: 18.83 KG/M2 | HEIGHT: 65 IN

## 2024-02-27 PROBLEM — M65.9 SYNOVITIS OF LEFT KNEE: Status: ACTIVE | Noted: 2024-02-22

## 2024-02-27 NOTE — ADDENDUM
[FreeTextEntry1] : This note was written by Meera Sanches on 02/27/2024 acting as scribe for Silverio Joseph III, MD

## 2024-02-27 NOTE — HISTORY OF PRESENT ILLNESS
[de-identified] : The patient comes in today for review of MRI of the left knee, which is noted below.  He denies any fevers or chills.

## 2024-02-27 NOTE — PHYSICAL EXAM
[de-identified] : Left Knee: Knee: Range of Motion in Degrees	 	                  Claimant:	Normal:	 Flexion Active	  135 	                135-degrees	 Flexion Passive	  135	                135-degrees	 Extension Active	  0-5	                0-5-degrees	 Extension Passive	  0-5	                0-5-degrees	   Mild to moderate effusion, markedly less than it was before.  [de-identified] : Gait and Station:  Ambulating with a slightly antalgic to antalgic gait.  Station:  Normal.  [de-identified] : Appearance:  Well-developed, well-nourished male in no acute distress.   [de-identified] : Review of MRI of the left knee shows a lot of inflammation, cannot rule out an infection.

## 2024-02-27 NOTE — DISCUSSION/SUMMARY
[de-identified] : The patient presents with synovitis of the left knee.  At this time, I recommend the patient undergo aspiration to rule out infection, assess for Lyme's and assess for crystal deposition disease.  He will be reassessed once this is done.

## 2024-02-28 ENCOUNTER — APPOINTMENT (OUTPATIENT)
Dept: ULTRASOUND IMAGING | Facility: CLINIC | Age: 19
End: 2024-02-28

## 2024-02-28 ENCOUNTER — OUTPATIENT (OUTPATIENT)
Dept: OUTPATIENT SERVICES | Facility: HOSPITAL | Age: 19
LOS: 1 days | End: 2024-02-28
Payer: COMMERCIAL

## 2024-02-28 ENCOUNTER — RESULT REVIEW (OUTPATIENT)
Age: 19
End: 2024-02-28

## 2024-02-28 DIAGNOSIS — Z90.89 ACQUIRED ABSENCE OF OTHER ORGANS: Chronic | ICD-10-CM

## 2024-02-28 DIAGNOSIS — Z00.8 ENCOUNTER FOR OTHER GENERAL EXAMINATION: ICD-10-CM

## 2024-02-28 PROCEDURE — 20611 DRAIN/INJ JOINT/BURSA W/US: CPT

## 2024-02-28 PROCEDURE — 20611 DRAIN/INJ JOINT/BURSA W/US: CPT | Mod: LT

## 2024-03-06 ENCOUNTER — TRANSCRIPTION ENCOUNTER (OUTPATIENT)
Age: 19
End: 2024-03-06

## 2024-03-07 ENCOUNTER — TRANSCRIPTION ENCOUNTER (OUTPATIENT)
Age: 19
End: 2024-03-07

## 2024-03-08 ENCOUNTER — TRANSCRIPTION ENCOUNTER (OUTPATIENT)
Age: 19
End: 2024-03-08

## 2024-03-11 ENCOUNTER — APPOINTMENT (OUTPATIENT)
Dept: RHEUMATOLOGY | Facility: CLINIC | Age: 19
End: 2024-03-11
Payer: COMMERCIAL

## 2024-03-11 VITALS
BODY MASS INDEX: 18.33 KG/M2 | SYSTOLIC BLOOD PRESSURE: 104 MMHG | TEMPERATURE: 206.96 F | HEART RATE: 90 BPM | OXYGEN SATURATION: 98 % | WEIGHT: 110 LBS | DIASTOLIC BLOOD PRESSURE: 62 MMHG | HEIGHT: 65 IN

## 2024-03-11 DIAGNOSIS — K52.9 NONINFECTIVE GASTROENTERITIS AND COLITIS, UNSPECIFIED: ICD-10-CM

## 2024-03-11 DIAGNOSIS — M25.473 EFFUSION, UNSPECIFIED ANKLE: ICD-10-CM

## 2024-03-11 DIAGNOSIS — M25.469 EFFUSION, UNSPECIFIED KNEE: ICD-10-CM

## 2024-03-11 DIAGNOSIS — M19.90 UNSPECIFIED OSTEOARTHRITIS, UNSPECIFIED SITE: ICD-10-CM

## 2024-03-11 PROCEDURE — 99214 OFFICE O/P EST MOD 30 MIN: CPT

## 2024-03-11 PROCEDURE — G2211 COMPLEX E/M VISIT ADD ON: CPT | Mod: NC,1L

## 2024-03-11 NOTE — ASSESSMENT
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 Mcgraw) when he had joint pain in his ankles, swelling , neck followed by GI issues with blood in stool, multiple BMs. He had been having these symptoms since 5/2021.   Initially, his GI started him on  Humira in 1/2023 and was on it for 5- 6 months which helped the joint pain but did not help with the GI symptoms. Then transitioned to Vedolizumab/entivyo IV around 7/2023 (infusions was every 6 weeks now 8 weeks).This helped with the GI symptoms but not the joint pain. He was having less bloody stools,less BMs. However, had reoccurrence of joint pain.   Patient has pain left knee, left ankles episodes. Pain is in the medial left ankle and medial left knee. Right leg does not bother him.   He saw orthopedics in 11/2023 for left knee pain and swelling with MRI confirming inflammatory arthritis from 1/30/24. He had u/s guided aspiration as per ortho on 2/28/24  Patient is on prednisone on and off by GI (in [place] and also his GI in [place]) for over a year. Started on prednisone 20mg by his GI  and tapered to 15mg recently.   Other symptoms include low back pain especially at night while sleeping, fatigue, recent weight loss, pain in his Achilles tendon on and off, morning stiffness  Sometimes gets rashes (describes as red dots on his legs which goes away on its own, gets during UC flares). Denies eye inflammation/uveitis, dactylitis, psoriasis   MRI of Left knee (2/5/2024): Large joint effusion with synovitis and associated mild osteitis. Inner margin frayingo f lateral meniscus. DDx inflammatory arthritis vs. Crystalline arthritis. Cannot rule out septic arthritis with early osteomyelitis.   His hand xray, cervical spine, lumbar spine, Si joint xrays, foot/ankle xrays are unremarkable.    -Work up and hx indicating IBD associated arthritis   -Saw orthopedics and they sent him for u/s guided aspiration of left knee (labs sent by IR showed elevated [id number], [id number], neg gram stain, cultures pending)  - Given left ankle swelling, also gave him IR guided aspiration with injection for which he has an appt pn  3/13/23 - Given left knee residual swelling despite aspiration, patient would like IR guided steroid injection (he is aware cultures are still pending but given overall picture likely due to IBD inflammatory arthritis and no growth till now and no signs of fever, chills, he would like to go ahead with steroid injection) -Failed Humira and Entyvio . GI switched him to Stelara.  First dose 3/13/24 -On Prednisone per GI  --He is seeing hematology for history of DVT and is on Eliquis  -Side effects of Stelara were discussed with the pt in detail including increased risk of infection and hypersensitivity reaction. Advised pt to seek medical care ASAP if he/she has an infection and advised to stop the medication until infection resolves. Hepatitis panel negative 2024 TB negative 2024    Total time spent in review of patient history, clinical exam, management, counseling, and plan of care: 30 min Follow up 2 months

## 2024-03-11 NOTE — ASSESSMENT
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 Mcgraw) when he had joint pain in his ankles, swelling , neck followed by GI issues with blood in stool, multiple BMs. He had been having these symptoms since 5/2021.   Initially, his GI started him on  Humira in 1/2023 and was on it for 5- 6 months which helped the joint pain but did not help with the GI symptoms. Then transitioned to Vedolizumab/entivyo IV around 7/2023 (infusions was every 6 weeks now 8 weeks).This helped with the GI symptoms but not the joint pain. He was having less bloody stools,less BMs. However, had reoccurrence of joint pain.   Patient has pain left knee, left ankles episodes. Pain is in the medial left ankle and medial left knee. Right leg does not bother him.   He saw orthopedics in 11/2023 for left knee pain and swelling with MRI confirming inflammatory arthritis from 1/30/24. He had u/s guided aspiration as per ortho on 2/28/24  Patient is on prednisone on and off by GI (in [place] and also his GI in [place]) for over a year. Started on prednisone 20mg by his GI  and tapered to 15mg recently.   Other symptoms include low back pain especially at night while sleeping, fatigue, recent weight loss, pain in his Achilles tendon on and off, morning stiffness  Sometimes gets rashes (describes as red dots on his legs which goes away on its own, gets during UC flares). Denies eye inflammation/uveitis, dactylitis, psoriasis   MRI of Left knee (2/5/2024): Large joint effusion with synovitis and associated mild osteitis. Inner margin frayingo f lateral meniscus. DDx inflammatory arthritis vs. Crystalline arthritis. Cannot rule out septic arthritis with early osteomyelitis.   His hand xray, cervical spine, lumbar spine, Si joint xrays, foot/ankle xrays are unremarkable.    -Work up and hx indicating IBD associated arthritis   -Saw orthopedics and they sent him for u/s guided aspiration of left knee (labs sent by IR showed elevated [id number], [id number], neg gram stain, cultures pending)  - Given left ankle swelling, also gave him IR guided aspiration with injection for which he has an appt pn  3/13/23 - Given left knee residual swelling despite aspiration, patient would like IR guided steroid injection (he is aware cultures are still pending but given overall picture likely due to IBD inflammatory arthritis and no growth till now and no signs of fever, chills, he would like to go ahead with steroid injection) -Failed Humira and Entyvio . GI switched him to Stelara.  First dose 3/13/24 -On Prednisone per GI  --He is seeing hematology for history of DVT and is on Eliquis  -Side effects of Stelara were discussed with the pt in detail including increased risk of infection and hypersensitivity reaction. Advised pt to seek medical care ASAP if he/she has an infection and advised to stop the medication until infection resolves. Hepatitis panel negative 2024 TB negative 2024    Total time spent in review of patient history, clinical exam, management, counseling, and plan of care: 30 min Follow up 2 months

## 2024-03-11 NOTE — HISTORY OF PRESENT ILLNESS
[FreeTextEntry1] :     19 y/o male presents for follow up for IBD enteropathy. Patient was initially seen on 2/15/24  Patient has history of lingual tonsil biopsy with pathology showing florid follicular lymphoid hyperplasia. The mass had grown back and patient was discussing with ENT about potential surgical removal (last seen 2020), DVT on left leg (5/2023, follows with heme and continues to be on eliquis 5mg) , ulcerative colitis diagnosed in 12/2022 (through colonoscopy showing severe ulcerative proctitis) GI Dr. Mcgraw)  He initially had joint pain in his ankles, swelling , neck followed by GI issues with blood in stool, multiple BMs. He had been having these symptoms since 5/2021.  So far his GI has managed him with:  Initially, he was on Humira in 1/2023 and was on it for 5- 6 months which helped the joint pain but did not help with the GI symptoms. Then transitioned to Vedolizumab/entivyo IV around 7/2023 (infusions was every 6 weeks now 8 weeks).This helped with the GI symptoms but not the joint pain. He is having less blood stools,less BMs. However, now reoccurrence of joint pain.  Patient has pain left knee, left ankles episodes  Pain is in the medial left ankle and medial left knee.  Right leg does not bother him.  He saw orthopedics in 11/2023 for left knee pain and swelling with MRI confirming inflammatory arthritis from 1/30/24  Patient is on prednisone on and off by GI (in [place] and also his GI in [place]) for over a year.  Patient has low back pain nyasia at night while sleeping. He also reports cramping in LE /bottom of feet at nigh time  Has morning stiffness  Intermittent fatigue, recent weight loss  Sometimes pain in Achilles tendon, no dactylitis.  Sometimes hand pain at night and he reports cracking his joints which makes it better  Sometimes gets rashes (describes as red dots on his legs which goes away on its own, gets during UC flares)  Patient denies other episodes of joint pains, joint swelling, joint erythema/warmth,, fever, chills, nasopharyngeal ulcers, chest pain, SOB, nausea, vomiting, diarrhea, dysuria, hematuria, no recent rash, photosensitivity, Raynaud's, alopecia, dry eyes, dry mouth, eye pain/redness, vision changes, myalgias, muscle weakness, dysphagia, numbness/tingling,      MRI of Left knee (2/5/2024): Large joint effusion with synovitis and associated mild osteitis. Inner margin frayingo f lateral meniscus. DDx inflammatory arthritis vs. Crystalline arthritis. Cannot rule out septic arthritis with early osteomyelitis.    Since last visit;  - patient was seen by his GI on 2/22/24 and they recommended switching to Stelara , they also tapered hi prednisone to 15mg from 20mg and plan to taper further once Stelara starts - First dose of Stelara will be 3/13/23 per his GI  -patient saw ortho and they recommended U/S joint aspiration; he had that done on 2/28/24 with full results pending - He had 10cc of fluid taken out form his knee with improvement in knee pain and swelling. He felt fine however he started having left ankle swelling and pain over last few days; he was given IR guided left ankle aspiration/injection script and  he has appt on Wed  Labs  2.16.2024 Normal/neg KIKA, dsDNA, smith, RNP, SSA, SSB, HLAB 27, RF, CCP , CRP 57 14.33 eta 0.97    Imaging: Xray hand  IMPRESSION: No fractures or dislocations. Preserved joint spaces and no joint margin erosions. Carpal bones normally aligned. Neutral ulnar variance. No lytic or blastic lesions.    Xray SI joint  Xray lumbar spine:  IMPRESSION:  No compression fractures, spondylolistheses, or spondylolysis defects.  Disk space heights preserved and facet alignment maintained.  Unremarkable SI joints and partially visualized hips.  No lytic or blastic lesions.    xray cervical spine:: Reversal of the expected of cervical lordosis may represent muscle spasm.   Xr foot ankle  No fractures or dislocations.  Bilaterally congruent ankle mortises with smooth intact talar domes. Tarsometatarsal alignment maintained without evidence for a Lisfranc injury.

## 2024-03-11 NOTE — HISTORY OF PRESENT ILLNESS
[FreeTextEntry1] :     17 y/o male presents for follow up for IBD enteropathy. Patient was initially seen on 2/15/24  Patient has history of lingual tonsil biopsy with pathology showing florid follicular lymphoid hyperplasia. The mass had grown back and patient was discussing with ENT about potential surgical removal (last seen 2020), DVT on left leg (5/2023, follows with heme and continues to be on eliquis 5mg) , ulcerative colitis diagnosed in 12/2022 (through colonoscopy showing severe ulcerative proctitis) GI Dr. Mcgraw)  He initially had joint pain in his ankles, swelling , neck followed by GI issues with blood in stool, multiple BMs. He had been having these symptoms since 5/2021.  So far his GI has managed him with:  Initially, he was on Humira in 1/2023 and was on it for 5- 6 months which helped the joint pain but did not help with the GI symptoms. Then transitioned to Vedolizumab/entivyo IV around 7/2023 (infusions was every 6 weeks now 8 weeks).This helped with the GI symptoms but not the joint pain. He is having less blood stools,less BMs. However, now reoccurrence of joint pain.  Patient has pain left knee, left ankles episodes  Pain is in the medial left ankle and medial left knee.  Right leg does not bother him.  He saw orthopedics in 11/2023 for left knee pain and swelling with MRI confirming inflammatory arthritis from 1/30/24  Patient is on prednisone on and off by GI (in [place] and also his GI in [place]) for over a year.  Patient has low back pain nyasia at night while sleeping. He also reports cramping in LE /bottom of feet at nigh time  Has morning stiffness  Intermittent fatigue, recent weight loss  Sometimes pain in Achilles tendon, no dactylitis.  Sometimes hand pain at night and he reports cracking his joints which makes it better  Sometimes gets rashes (describes as red dots on his legs which goes away on its own, gets during UC flares)  Patient denies other episodes of joint pains, joint swelling, joint erythema/warmth,, fever, chills, nasopharyngeal ulcers, chest pain, SOB, nausea, vomiting, diarrhea, dysuria, hematuria, no recent rash, photosensitivity, Raynaud's, alopecia, dry eyes, dry mouth, eye pain/redness, vision changes, myalgias, muscle weakness, dysphagia, numbness/tingling,      MRI of Left knee (2/5/2024): Large joint effusion with synovitis and associated mild osteitis. Inner margin frayingo f lateral meniscus. DDx inflammatory arthritis vs. Crystalline arthritis. Cannot rule out septic arthritis with early osteomyelitis.    Since last visit;  - patient was seen by his GI on 2/22/24 and they recommended switching to Stelara , they also tapered hi prednisone to 15mg from 20mg and plan to taper further once Stelara starts - First dose of Stelara will be 3/13/23 per his GI  -patient saw ortho and they recommended U/S joint aspiration; he had that done on 2/28/24 with full results pending - He had 10cc of fluid taken out form his knee with improvement in knee pain and swelling. He felt fine however he started having left ankle swelling and pain over last few days; he was given IR guided left ankle aspiration/injection script and  he has appt on Wed  Labs  2.16.2024 Normal/neg KIKA, dsDNA, smith, RNP, SSA, SSB, HLAB 27, RF, CCP , CRP 57 14.33 eta 0.97    Imaging: Xray hand  IMPRESSION: No fractures or dislocations. Preserved joint spaces and no joint margin erosions. Carpal bones normally aligned. Neutral ulnar variance. No lytic or blastic lesions.    Xray SI joint  Xray lumbar spine:  IMPRESSION:  No compression fractures, spondylolistheses, or spondylolysis defects.  Disk space heights preserved and facet alignment maintained.  Unremarkable SI joints and partially visualized hips.  No lytic or blastic lesions.    xray cervical spine:: Reversal of the expected of cervical lordosis may represent muscle spasm.   Xr foot ankle  No fractures or dislocations.  Bilaterally congruent ankle mortises with smooth intact talar domes. Tarsometatarsal alignment maintained without evidence for a Lisfranc injury.

## 2024-03-11 NOTE — PHYSICAL EXAM
[TextEntry] : GENERAL: Appears in no acute distress HEENT: EOMI. No conjunctival erythema. Moist mucous membranes. No nasopharyngeal ulcers NECK: Supple, no cervical lymphadenopathy ABDOMINAL: Soft, nontender MSK: Left knee swelling with some warmth no tenderness, left ankle swelling with warmth (both improved from prior) No other active synovitis, swelling, erythema, or warmth. No joint tenderness to palpation. No SI joint tenderness, No Bouchards or Heberdens nodess No deformities. No crepitus. Normal ROM of neck, back, b/l upper and lower extremities. No dactylitis, nail pitting SKIN: No lesions or rashes NEURO: No focal deficits. Motor strength 5/5 in major muscle groups of b/l UE and LE. Sensation to soft touch intact in major dermatomes of b/l UE and LE. PSYCH: Normal affect and thought process.

## 2024-03-14 ENCOUNTER — APPOINTMENT (OUTPATIENT)
Dept: ULTRASOUND IMAGING | Facility: CLINIC | Age: 19
End: 2024-03-14
Payer: COMMERCIAL

## 2024-03-14 ENCOUNTER — OUTPATIENT (OUTPATIENT)
Dept: OUTPATIENT SERVICES | Facility: HOSPITAL | Age: 19
LOS: 1 days | End: 2024-03-14
Payer: COMMERCIAL

## 2024-03-14 DIAGNOSIS — M25.473 EFFUSION, UNSPECIFIED ANKLE: ICD-10-CM

## 2024-03-14 DIAGNOSIS — Z90.89 ACQUIRED ABSENCE OF OTHER ORGANS: Chronic | ICD-10-CM

## 2024-03-14 PROCEDURE — 20611 DRAIN/INJ JOINT/BURSA W/US: CPT

## 2024-03-14 PROCEDURE — 20606 DRAIN/INJ JOINT/BURSA W/US: CPT | Mod: LT

## 2024-03-14 PROCEDURE — 20611 DRAIN/INJ JOINT/BURSA W/US: CPT | Mod: LT

## 2024-03-14 PROCEDURE — 20606 DRAIN/INJ JOINT/BURSA W/US: CPT

## 2024-05-09 ENCOUNTER — TRANSCRIPTION ENCOUNTER (OUTPATIENT)
Age: 19
End: 2024-05-09

## 2024-05-10 ENCOUNTER — TRANSCRIPTION ENCOUNTER (OUTPATIENT)
Age: 19
End: 2024-05-10

## 2024-05-14 ENCOUNTER — TRANSCRIPTION ENCOUNTER (OUTPATIENT)
Age: 19
End: 2024-05-14

## 2024-05-16 ENCOUNTER — TRANSCRIPTION ENCOUNTER (OUTPATIENT)
Age: 19
End: 2024-05-16

## 2024-05-16 RX ORDER — PREDNISONE 10 MG/1
10 TABLET ORAL
Qty: 21 | Refills: 0 | Status: ACTIVE | COMMUNITY
Start: 2024-05-16 | End: 1900-01-01

## 2024-05-20 ENCOUNTER — APPOINTMENT (OUTPATIENT)
Dept: RHEUMATOLOGY | Facility: CLINIC | Age: 19
End: 2024-05-20

## 2024-05-29 ENCOUNTER — TRANSCRIPTION ENCOUNTER (OUTPATIENT)
Age: 19
End: 2024-05-29

## 2024-06-04 ENCOUNTER — APPOINTMENT (OUTPATIENT)
Dept: RHEUMATOLOGY | Facility: CLINIC | Age: 19
End: 2024-06-04